# Patient Record
Sex: FEMALE | Race: WHITE | NOT HISPANIC OR LATINO | Employment: UNEMPLOYED | RURAL
[De-identification: names, ages, dates, MRNs, and addresses within clinical notes are randomized per-mention and may not be internally consistent; named-entity substitution may affect disease eponyms.]

---

## 2024-11-15 ENCOUNTER — OFFICE VISIT (OUTPATIENT)
Facility: CLINIC | Age: 19
End: 2024-11-15
Payer: MEDICAID

## 2024-11-15 VITALS
TEMPERATURE: 98 F | BODY MASS INDEX: 30.84 KG/M2 | HEART RATE: 80 BPM | RESPIRATION RATE: 18 BRPM | OXYGEN SATURATION: 98 % | DIASTOLIC BLOOD PRESSURE: 69 MMHG | WEIGHT: 153 LBS | SYSTOLIC BLOOD PRESSURE: 115 MMHG | HEIGHT: 59 IN

## 2024-11-15 DIAGNOSIS — N92.1 PROLONGED MENSTRUATION: ICD-10-CM

## 2024-11-15 DIAGNOSIS — N94.6 DYSMENORRHEA: ICD-10-CM

## 2024-11-15 DIAGNOSIS — E66.811 OBESITY (BMI 30.0-34.9): ICD-10-CM

## 2024-11-15 DIAGNOSIS — N89.8 VAGINA ITCHING: Primary | ICD-10-CM

## 2024-11-15 LAB
CANDIDA SPECIES: POSITIVE
GARDNERELLA: NEGATIVE
HCT VFR BLD AUTO: 40.8 % (ref 38–47)
HGB BLD-MCNC: 13.6 G/DL (ref 12–16)
TRICHOMONAS: NEGATIVE

## 2024-11-15 PROCEDURE — 87480 CANDIDA DNA DIR PROBE: CPT | Mod: ,,, | Performed by: CLINICAL MEDICAL LABORATORY

## 2024-11-15 PROCEDURE — 1159F MED LIST DOCD IN RCRD: CPT | Mod: CPTII,,, | Performed by: ADVANCED PRACTICE MIDWIFE

## 2024-11-15 PROCEDURE — 3008F BODY MASS INDEX DOCD: CPT | Mod: CPTII,,, | Performed by: ADVANCED PRACTICE MIDWIFE

## 2024-11-15 PROCEDURE — 85018 HEMOGLOBIN: CPT | Mod: ,,, | Performed by: CLINICAL MEDICAL LABORATORY

## 2024-11-15 PROCEDURE — 87510 GARDNER VAG DNA DIR PROBE: CPT | Mod: ,,, | Performed by: CLINICAL MEDICAL LABORATORY

## 2024-11-15 PROCEDURE — 99203 OFFICE O/P NEW LOW 30 MIN: CPT | Mod: ,,, | Performed by: ADVANCED PRACTICE MIDWIFE

## 2024-11-15 PROCEDURE — 87660 TRICHOMONAS VAGIN DIR PROBE: CPT | Mod: ,,, | Performed by: CLINICAL MEDICAL LABORATORY

## 2024-11-15 PROCEDURE — 85014 HEMATOCRIT: CPT | Mod: ,,, | Performed by: CLINICAL MEDICAL LABORATORY

## 2024-11-15 PROCEDURE — 3078F DIAST BP <80 MM HG: CPT | Mod: CPTII,,, | Performed by: ADVANCED PRACTICE MIDWIFE

## 2024-11-15 PROCEDURE — 3074F SYST BP LT 130 MM HG: CPT | Mod: CPTII,,, | Performed by: ADVANCED PRACTICE MIDWIFE

## 2024-11-15 RX ORDER — NORELGESTROMIN AND ETHINYL ESTRADIOL 35; 150 UG/MG; UG/MG
1 PATCH TRANSDERMAL WEEKLY
Qty: 3 PATCH | Refills: 11 | Status: SHIPPED | OUTPATIENT
Start: 2024-11-15 | End: 2025-11-15

## 2024-11-15 RX ORDER — NORELGESTROMIN AND ETHINYL ESTRADIOL 35; 150 UG/MG; UG/MG
1 PATCH TRANSDERMAL WEEKLY
COMMUNITY
Start: 2024-10-22 | End: 2024-11-15

## 2024-11-15 RX ORDER — IBUPROFEN 800 MG/1
800 TABLET ORAL EVERY 8 HOURS PRN
Qty: 60 TABLET | Refills: 2 | Status: SHIPPED | OUTPATIENT
Start: 2024-11-15 | End: 2025-11-15

## 2024-11-15 RX ORDER — CEPHALEXIN 500 MG/1
500 CAPSULE ORAL
COMMUNITY
Start: 2024-11-11 | End: 2024-11-21

## 2024-11-15 NOTE — LETTER
November 15, 2024      [facility]  [address]  [place] [address]  Phone: [phone]  Fax: [phone]       Patient: Marisol Farias   YOB: 2005  Date of Visit: 11/15/2024    To Whom It May Concern:    Marisol Farias  was at [facility] on 11/15/2024. The patient may return to work/school on 11/16/2024 with no restrictions. If you have any questions or concerns, or if I can be of further assistance, please do not hesitate to contact me.    Sincerely,    Jeremiah Brock LPN

## 2024-11-17 DIAGNOSIS — B37.31 YEAST VAGINITIS: Primary | ICD-10-CM

## 2024-11-17 PROBLEM — N89.8 VAGINA ITCHING: Status: ACTIVE | Noted: 2024-11-17

## 2024-11-17 PROBLEM — N94.6 DYSMENORRHEA: Status: ACTIVE | Noted: 2024-11-17

## 2024-11-17 PROBLEM — N92.1 PROLONGED MENSTRUATION: Status: ACTIVE | Noted: 2024-11-17

## 2024-11-17 RX ORDER — FLUCONAZOLE 150 MG/1
150 TABLET ORAL
Qty: 4 TABLET | Refills: 0 | Status: SHIPPED | OUTPATIENT
Start: 2024-11-17 | End: 2024-12-09

## 2024-11-18 ENCOUNTER — TELEPHONE (OUTPATIENT)
Dept: OBSTETRICS AND GYNECOLOGY | Facility: CLINIC | Age: 19
End: 2024-11-18
Payer: MEDICAID

## 2024-11-18 NOTE — PROGRESS NOTES
Patient ID:  Marisol Farias is a 19 y.o. female      Chief Complaint:   Chief Complaint   Patient presents with    Dysmenorrhea     Menstrual Pains  [facility] blood and urine test from her, put her on Birth Control patches, they told her to put it on the first day of her cycle. When she did that she put the patch on the bleeding increased on 2024 she went to [facility] ER they told her that she had a bad UTI and gave her medications to treat and told her to follow back up with the provider that gave her the patch but the [facility] didn't do anything.    Health Information     Stop using the Birth Control patch   Sexually Active  Declines STD Testing  Currently On Her cycle 11/15/2024  Never had a PAP    Vaginal Itching     X2 days         HPI:  Marisol presents as a new patient with c/o painful menstrual cycles and vaginal itching. [facility]. Was started on contraceptive patches for cycle regulation with instructions to start use first cycle day. Reports placed patch as instructed, bleeding increased so removed patch. 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 Was at work today and sent home by manager due to c/o painful cramps.  LMP: Patient's last menstrual period was 11/15/2024. 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     Headache     Hypothyroidism      Past Surgical History:   Procedure Laterality Date    ADENOIDECTOMY      TONSILLECTOMY         OB History          0    Para   0    Term   0       0    AB   0    Living   0         SAB   0    IAB   0    Ectopic   0     "Multiple   0    Live Births   0                 /69 (BP Location: Left arm, Patient Position: Sitting)   Pulse 80   Temp 98.2 °F (36.8 °C) (Oral)   Resp 18   Ht 4' 11" (1.499 m)   Wt 69.4 kg (153 lb)   LMP 11/15/2024   SpO2 98%   BMI 30.90 kg/m²   Wt Readings from Last 3 Encounters:   11/15/24 69.4 kg (153 lb)   11/26/23 72.7 kg (160 lb 3.2 oz)   07/07/23 73 kg (161 lb)          ROS:  Review of Systems   Constitutional: Negative.    Eyes: Negative.    Respiratory: Negative.     Cardiovascular: Negative.    Gastrointestinal: Negative.    Genitourinary:  Positive for dysmenorrhea and menstrual problem.   Musculoskeletal: Negative.    Neurological: Negative.    Psychiatric/Behavioral: Negative.            PHYSICAL EXAM:  Physical Exam  Constitutional:       Appearance: Normal appearance. She is obese.   Eyes:      Extraocular Movements: Extraocular movements intact.   Cardiovascular:      Rate and Rhythm: Normal rate.      Pulses: Normal pulses.   Pulmonary:      Effort: Pulmonary effort is normal.   Abdominal:      Palpations: Abdomen is soft.   Musculoskeletal:         General: Normal range of motion.      Cervical back: Normal range of motion.   Skin:     General: Skin is warm and dry.   Neurological:      Mental Status: She is alert and oriented to person, place, and time.   Psychiatric:         Mood and Affect: Mood normal.         Behavior: Behavior normal.         Thought Content: Thought content normal.         Judgment: Judgment normal.          Assessment:  Marisol Mueller" was seen today for dysmenorrhea, health information and vaginal itching.    Diagnoses and all orders for this visit:    Vagina itching  -     Bacterial Vaginosis    Dysmenorrhea  -     ibuprofen (ADVIL,MOTRIN) 800 MG tablet; Take 1 tablet (800 mg total) by mouth every 8 (eight) hours as needed for Pain. Take 1 tablet every 8 hours during menstrual cycle for up to 7 days. Take with food to prevent upset stomach.  -     " norelgestromin-ethinyl estradiol (XULANE) 150-35 mcg/24 hr; Place 1 patch onto the skin once a week.    Prolonged menstruation  -     Hemoglobin and Hematocrit; Future  -     Hemoglobin and Hematocrit    Obesity (BMI 30.0-34.9)          ICD-10-CM ICD-9-CM    1. Vagina itching  N89.8 698.1 Bacterial Vaginosis      2. Dysmenorrhea  N94.6 625.3 ibuprofen (ADVIL,MOTRIN) 800 MG tablet      norelgestromin-ethinyl estradiol (XULANE) 150-35 mcg/24 hr      3. Prolonged menstruation  N92.1 626.2 Hemoglobin and Hematocrit      Hemoglobin and Hematocrit      4. Obesity (BMI 30.0-34.9)  E66.811 278.00           Plan:  Affirm swab self collected  Blood drawn for H&H  Will call or send My Chart message after results reviewed  Explained cycle benefits with use of contraceptive patches  Encouraged to restart, use consistently for next 11 weeks, can leave off 12th week for cycle, new rx sent  Rx Ibuprofen sent, instructed on use every 8 hours for up to 7 days when bleeding, only use during cycle, take with food    No follow-ups on file.

## 2024-11-18 NOTE — TELEPHONE ENCOUNTER
----- Message from Katarina Gaxiola sent at 11/17/2024  6:28 PM CST -----  Review labs. Rx sent for Diflucan to treat yeast infection has been sent in to her pharmacy. H&H wnl, no anemia. Continue patches and Ibuprofen. Follow up in 3 months. May need to update her My [facility].

## 2024-11-18 NOTE — PROGRESS NOTES
Review labs. Rx sent for Diflucan to treat yeast infection has been sent in to her pharmacy. H&H wnl, no anemia. Continue patches and Ibuprofen. Follow up in 3 months. May need to update her My [facility].

## 2024-12-10 ENCOUNTER — TELEPHONE (OUTPATIENT)
Dept: OBSTETRICS AND GYNECOLOGY | Facility: CLINIC | Age: 19
End: 2024-12-10
Payer: MEDICAID

## 2024-12-10 NOTE — TELEPHONE ENCOUNTER
----- Message from Tech Laturina sent at 12/10/2024  8:56 AM CST -----  Who Called: Marisol Farias    Caller is requesting a sooner appointment. Caller declined first available appointment listed below. Caller will not accept being placed on the waitlist and is requesting a message be sent to doctor.    When is the first available appointment?N/A   Options offered (Virtual Visit, Urgent Care):   Symptoms: PAINFUL CYST       Preferred Method of Contact: Phone Call  Patient's Preferred Phone Number on File: [phone]   Best Call Back Number, if different:[phone]  Additional Information: Patient would like to be seen because of a cyst in her groin area that's painful when she walks. I did not see anything available it said send in-basket.

## 2024-12-16 ENCOUNTER — OFFICE VISIT (OUTPATIENT)
Facility: CLINIC | Age: 19
End: 2024-12-16
Payer: MEDICAID

## 2024-12-16 VITALS
HEART RATE: 94 BPM | HEIGHT: 59 IN | DIASTOLIC BLOOD PRESSURE: 62 MMHG | WEIGHT: 155.19 LBS | RESPIRATION RATE: 17 BRPM | SYSTOLIC BLOOD PRESSURE: 103 MMHG | OXYGEN SATURATION: 99 % | BODY MASS INDEX: 31.28 KG/M2

## 2024-12-16 DIAGNOSIS — Z00.00 NORMAL EXTERNAL GENITALIA EXAM: Primary | ICD-10-CM

## 2024-12-16 DIAGNOSIS — E66.811 OBESITY (BMI 30.0-34.9): ICD-10-CM

## 2024-12-16 PROCEDURE — 3078F DIAST BP <80 MM HG: CPT | Mod: CPTII,,, | Performed by: ADVANCED PRACTICE MIDWIFE

## 2024-12-16 PROCEDURE — 99212 OFFICE O/P EST SF 10 MIN: CPT | Mod: ,,, | Performed by: ADVANCED PRACTICE MIDWIFE

## 2024-12-16 PROCEDURE — 1159F MED LIST DOCD IN RCRD: CPT | Mod: CPTII,,, | Performed by: ADVANCED PRACTICE MIDWIFE

## 2024-12-16 PROCEDURE — 3074F SYST BP LT 130 MM HG: CPT | Mod: CPTII,,, | Performed by: ADVANCED PRACTICE MIDWIFE

## 2024-12-16 PROCEDURE — 3008F BODY MASS INDEX DOCD: CPT | Mod: CPTII,,, | Performed by: ADVANCED PRACTICE MIDWIFE

## 2024-12-19 NOTE — PROGRESS NOTES
"Patient ID:  Marisol Farias is a 19 y.o. female      Chief Complaint:   Chief Complaint   Patient presents with    Knot on Labia     Showed up a week ago and had a head on it and started draining blood        HPI:  Marisol is in with c/o bump on labia which appeared  week ago. Reports area drained and has decreased in size.   LMP: Patient's last menstrual period was 2024.   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     Headache     Hypothyroidism      Past Surgical History:   Procedure Laterality Date    ADENOIDECTOMY      TONSILLECTOMY         OB History          0    Para   0    Term   0       0    AB   0    Living   0         SAB   0    IAB   0    Ectopic   0    Multiple   0    Live Births   0                 /62 (BP Location: Left arm, Patient Position: Sitting)   Pulse 94   Resp 17   Ht 4' 11" (1.499 m)   Wt 70.4 kg (155 lb 3.2 oz)   LMP 2024   SpO2 99%   BMI 31.35 kg/m²   Wt Readings from Last 3 Encounters:   24 70.4 kg (155 lb 3.2 oz)   11/15/24 69.4 kg (153 lb)   23 72.7 kg (160 lb 3.2 oz)          ROS:  Review of Systems   Constitutional: Negative.    Eyes: Negative.    Respiratory: Negative.     Cardiovascular: Negative.    Gastrointestinal: Negative.    Genitourinary:  Positive for genital sores.   Musculoskeletal: Negative.    Neurological: Negative.    Psychiatric/Behavioral: Negative.            PHYSICAL EXAM:  Physical Exam  Constitutional:       Appearance: Normal appearance. She is obese.   Eyes:      Extraocular Movements: Extraocular movements intact.   Cardiovascular:      Rate and Rhythm: Normal rate.      Pulses: Normal pulses.   Pulmonary:      Effort: Pulmonary effort " "is normal.   Genitourinary:     Labia:         Right: No rash, tenderness or lesion.         Left: No rash, tenderness or lesion.       Comments: External genitalia examined thoroughly. No lesions, boils, or cysts noted.   Musculoskeletal:         General: Normal range of motion.      Cervical back: Normal range of motion.   Skin:     General: Skin is warm and dry.   Neurological:      Mental Status: She is alert and oriented to person, place, and time.   Psychiatric:         Mood and Affect: Mood normal.         Behavior: Behavior normal.         Thought Content: Thought content normal.         Judgment: Judgment normal.          Assessment:  Marisol Mueller" was seen today for knot on labia.    Diagnoses and all orders for this visit:    Normal external genitalia exam    Obesity (BMI 30.0-34.9)          ICD-10-CM ICD-9-CM    1. Normal external genitalia exam  Z00.00 V70.9       2. Obesity (BMI 30.0-34.9)  E66.811 278.00           Plan:  Explained "knot" may have been a boil[facility]  Encouraged to avoid shaving for a few week    Follow up for care as needed.        "

## 2025-01-24 ENCOUNTER — OFFICE VISIT (OUTPATIENT)
Dept: FAMILY MEDICINE | Facility: CLINIC | Age: 20
End: 2025-01-24
Payer: MEDICAID

## 2025-01-24 VITALS
SYSTOLIC BLOOD PRESSURE: 117 MMHG | RESPIRATION RATE: 16 BRPM | DIASTOLIC BLOOD PRESSURE: 77 MMHG | HEART RATE: 80 BPM | OXYGEN SATURATION: 97 % | TEMPERATURE: 98 F

## 2025-01-24 DIAGNOSIS — N30.00 ACUTE CYSTITIS WITHOUT HEMATURIA: ICD-10-CM

## 2025-01-24 DIAGNOSIS — R30.0 DYSURIA: Primary | ICD-10-CM

## 2025-01-24 LAB
AMORPHOUS CRYSTALS, UA (OHS): ABNORMAL /HPF
BACTERIA #/AREA URNS HPF: ABNORMAL /HPF
BILIRUB UR QL STRIP: NEGATIVE
CLARITY UR: CLEAR
COLOR UR: ABNORMAL
GLUCOSE UR STRIP-MCNC: NORMAL MG/DL
KETONES UR STRIP-SCNC: NEGATIVE MG/DL
LEUKOCYTE ESTERASE UR QL STRIP: ABNORMAL
MUCOUS, UA: ABNORMAL /LPF
NITRITE UR QL STRIP: NEGATIVE
PH UR STRIP: 6 PH UNITS
PROT UR QL STRIP: NEGATIVE
RBC # UR STRIP: ABNORMAL /UL
RBC #/AREA URNS HPF: 1 /HPF
SP GR UR STRIP: 1.02
SQUAMOUS #/AREA URNS LPF: ABNORMAL /HPF
UROBILINOGEN UR STRIP-ACNC: NORMAL MG/DL
WBC #/AREA URNS HPF: 19 /HPF

## 2025-01-24 PROCEDURE — 3078F DIAST BP <80 MM HG: CPT | Mod: CPTII,,, | Performed by: FAMILY MEDICINE

## 2025-01-24 PROCEDURE — 99213 OFFICE O/P EST LOW 20 MIN: CPT | Mod: GC,,, | Performed by: FAMILY MEDICINE

## 2025-01-24 PROCEDURE — 1160F RVW MEDS BY RX/DR IN RCRD: CPT | Mod: CPTII,,, | Performed by: FAMILY MEDICINE

## 2025-01-24 PROCEDURE — 1159F MED LIST DOCD IN RCRD: CPT | Mod: CPTII,,, | Performed by: FAMILY MEDICINE

## 2025-01-24 PROCEDURE — 3074F SYST BP LT 130 MM HG: CPT | Mod: CPTII,,, | Performed by: FAMILY MEDICINE

## 2025-01-24 PROCEDURE — 81001 URINALYSIS AUTO W/SCOPE: CPT | Mod: ,,, | Performed by: CLINICAL MEDICAL LABORATORY

## 2025-01-24 PROCEDURE — 87086 URINE CULTURE/COLONY COUNT: CPT | Mod: ,,, | Performed by: CLINICAL MEDICAL LABORATORY

## 2025-01-24 RX ORDER — SULFAMETHOXAZOLE AND TRIMETHOPRIM 800; 160 MG/1; MG/1
1 TABLET ORAL 2 TIMES DAILY
Qty: 6 TABLET | Refills: 0 | Status: SHIPPED | OUTPATIENT
Start: 2025-01-24 | End: 2025-01-27

## 2025-01-24 NOTE — PROGRESS NOTES
Subjective:       Patient ID: Marisol Farias is a 19 y.o. female.    Chief Complaint: Dysuria    18 yo F with a PMH of UTI, asthma, anxiety/dep, ADHD, von willebrand disease, hypothyroidism, vulvovaginal candida who presents to [facility] clinic with dysuria. Symptoms started 2 days ago, she took OTC Azo and increased fluid intake with some relief, then today symptoms were worse with dysuria and burning sensation. She has a history of UTI and symptoms remind her of another episode. Endorses mild suprapubic pain. Denies flank pain. Denies f/c/cp/sob/n/v/d. Denies appetite, urinary, or bowel habit changes. Denies vaginal discharge or bloody discharge. Patient is sexually active with one partner and denies multiple or new partners. Patient's period started today. She doesn't think she is pregnant.           Current Outpatient Medications:     cetirizine (ZYRTEC) 10 MG tablet, Take 1 tablet (10 mg total) by mouth once daily., Disp: 30 tablet, Rfl: 3    ibuprofen (ADVIL,MOTRIN) 800 MG tablet, Take 1 tablet (800 mg total) by mouth every 8 (eight) hours as needed for Pain. Take 1 tablet every 8 hours during menstrual cycle for up to 7 days. Take with food to prevent upset stomach., Disp: 60 tablet, Rfl: 2    sertraline (ZOLOFT) 50 MG tablet, Take 1 tablet (50 mg total) by mouth once daily., Disp: 90 tablet, Rfl: 3    sulfamethoxazole-trimethoprim 800-160mg (BACTRIM DS) 800-160 mg Tab, Take 1 tablet by mouth 2 (two) times daily. 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   HENT:  Negative for trouble swallowing.    Eyes:  Negative for visual disturbance.   Respiratory:  Negative for shortness of breath.    Cardiovascular:  Negative for chest pain.   Gastrointestinal:  Negative for abdominal pain, diarrhea, nausea and vomiting.   Genitourinary:  Positive for dysuria. Negative for bladder incontinence, decreased urine volume, difficulty urinating, flank pain, hematuria, vaginal bleeding and vaginal discharge.   Musculoskeletal:  Negative for back pain.   Integumentary:  Negative for wound.   Neurological:  Negative for syncope and headaches.   Psychiatric/Behavioral:  Negative for sleep disturbance.          Current Medications:   Medication List with Changes/Refills   New Medications    SULFAMETHOXAZOLE-TRIMETHOPRIM 800-160MG (BACTRIM DS) 800-160 MG TAB    Take 1 tablet by mouth 2 (two) times daily. for 3 days       Start Date: 2025 End Date: 2025   Current Medications    CETIRIZINE (ZYRTEC) 10 MG TABLET    Take 1 tablet (10 mg total) by mouth once daily.       Start Date: 5/3/2022  End Date: --    IBUPROFEN (ADVIL,MOTRIN) 800 MG TABLET    Take 1 tablet (800 mg total) by mouth every 8 (eight) hours as needed for Pain. Take 1 tablet every 8 hours during menstrual cycle for up to 7 days. Take with food to prevent upset stomach.       Start Date: 11/15/2024End Date: 11/15/2025    SERTRALINE (ZOLOFT) 50 MG  TABLET    Take 1 tablet (50 mg total) by mouth once daily.       Start Date: 7/7/2023  End Date: 7/6/2024   Discontinued Medications    ALBUTEROL SULFATE (PROAIR RESPICLICK) 90 MCG/ACTUATION INHALER    Inhale into the lungs every 6 (six) hours as needed.       Start Date: --        End Date: 1/24/2025    ASMANEX TWISTHALER 220 MCG/ ACTUATION (30) INHALER    Inhale 1 puff into the lungs 2 (two) times daily.       Start Date: 2/23/2021 End Date: 1/24/2025    AZITHROMYCIN (Z-MATEO) 250 MG TABLET    Take 2 tablets by mouth on day 1; Take 1 tablet by mouth on days 2-5       Start Date: 11/26/2023End Date: 1/24/2025    EPINEPHRINE (EPIPEN) 0.3 MG/0.3 ML ATIN    INJECT 0.3 mL intramuscularly once AS NEEDED FOR anaphylaxis       Start Date: 1/5/2022  End Date: 1/24/2025    FLUTICASONE PROPIONATE (FLONASE) 50 MCG/ACTUATION NASAL SPRAY    2 sprays by Each Nostril route once daily.       Start Date: 2/23/2021 End Date: 1/24/2025    NORELGESTROMIN-ETHINYL ESTRADIOL (XULANE) 150-35 MCG/24 HR    Place 1 patch onto the skin once a week.       Start Date: 11/15/2024End Date: 1/24/2025    ONDANSETRON (ZOFRAN) 4 MG TABLET    Take 1 tablet (4 mg total) by mouth every 6 (six) hours as needed for Nausea.       Start Date: 12/5/2022 End Date: 1/24/2025            Objective:        Vitals:    01/24/25 1751   BP: 117/77   Patient Position: Sitting   Pulse: 80   Resp: 16   Temp: 97.8 °F (36.6 °C)   SpO2: 97%       Physical Exam  Vitals and nursing note reviewed.   Constitutional:       General: She is not in acute distress.     Appearance: Normal appearance. She is not ill-appearing, toxic-appearing or diaphoretic.   HENT:      Head: Normocephalic and atraumatic.      Right Ear: External ear normal.      Left Ear: External ear normal.      Nose: Nose normal.      Mouth/Throat:      Pharynx: Oropharynx is clear. No oropharyngeal exudate or posterior oropharyngeal erythema.   Eyes:      General: No scleral icterus.        Right eye: No  discharge.         Left eye: No discharge.      Pupils: Pupils are equal, round, and reactive to light.   Cardiovascular:      Rate and Rhythm: Normal rate and regular rhythm.      Pulses: Normal pulses.      Heart sounds: Normal heart sounds. No murmur heard.  Pulmonary:      Effort: Pulmonary effort is normal. No respiratory distress.      Breath sounds: Normal breath sounds. No wheezing.   Abdominal:      General: There is no distension.      Palpations: Abdomen is soft.   Musculoskeletal:         General: No swelling or deformity.      Cervical back: Neck supple.      Right lower leg: No edema.      Left lower leg: No edema.   Skin:     General: Skin is warm and dry.      Coloration: Skin is not jaundiced.   Neurological:      Mental Status: She is alert.      Gait: Gait normal.   Psychiatric:         Mood and Affect: Mood normal.         Behavior: Behavior normal.         Thought Content: Thought content normal.         Judgment: Judgment normal.               Lab Results   Component Value Date    WBC 7.82 07/07/2023    HGB 13.6 11/15/2024    HCT 40.8 11/15/2024     07/07/2023    CHOL 180 07/07/2023    TRIG 151 (H) 07/07/2023    HDL 41 07/07/2023    ALT 21 07/07/2023    AST 18 07/07/2023     07/07/2023    K 3.8 07/07/2023     (H) 07/07/2023    CREATININE 0.68 07/07/2023    BUN 12 07/07/2023    CO2 26 07/07/2023    HGBA1C 4.9 11/18/2021      Assessment:       1. Dysuria    2. Acute cystitis without hematuria        Plan:         Problem List Items Addressed This Visit          Renal/    Acute cystitis without hematuria     Monitor  dysuria. Symptoms started 2 days ago, she took OTC Azo and increased fluid intake with some relief, then today symptoms were worse with dysuria and burning sensation. She has a history of UTI and symptoms remind her of another episode. Endorses mild suprapubic pain. Denies flank pain. Denies f/c/cp/sob/n/v/d. Denies appetite, urinary, or bowel habit changes.  Denies vaginal discharge or bloody discharge. Patient is sexually active with one partner and denies multiple or new partners. Patient's period started today. She doesn't think she is pregnant.     Evaluate  VSS. Unremarkable exam - see PE    Assess  UTI most likely    Treat  UA with reflex culture pending  Bactrim DS bid x3 days  RTC as needed            Other Visit Diagnoses       Dysuria    -  Primary    Relevant Medications    sulfamethoxazole-trimethoprim 800-160mg (BACTRIM DS) 800-160 mg Tab    Other Relevant Orders    Urinalysis, Reflex to Urine Culture              Follow up if symptoms worsen or fail to improve.    Karthik Tenorio DO     Instructed patient that if symptoms fail to improve or worsen patient should seek immediate medical attention or report to the nearest emergency department. Patient expressed verbal agreement and understanding to this plan of care.

## 2025-01-25 NOTE — ASSESSMENT & PLAN NOTE
Monitor  dysuria. Symptoms started 2 days ago, she took OTC Azo and increased fluid intake with some relief, then today symptoms were worse with dysuria and burning sensation. She has a history of UTI and symptoms remind her of another episode. Endorses mild suprapubic pain. Denies flank pain. Denies f/c/cp/sob/n/v/d. Denies appetite, urinary, or bowel habit changes. Denies vaginal discharge or bloody discharge. Patient is sexually active with one partner and denies multiple or new partners. Patient's period started today. She doesn't think she is pregnant.     Evaluate  VSS. Unremarkable exam - see PE    Assess  UTI most likely    Treat  UA with reflex culture pending  Bactrim DS bid x3 days  RTC as needed

## 2025-01-26 LAB — UA COMPLETE W REFLEX CULTURE PNL UR: NO GROWTH

## 2025-01-28 NOTE — PROGRESS NOTES
All results discussed with pt over the phone: UA showed WBC 19, leukocytes trace, nitrites negative. Urine culture showed no growth. She finished 3 days of bactrim and said dysuria has resolved. She was told to f/u as needed. All questions answered.

## 2025-01-30 NOTE — PROGRESS NOTES
Monitor:All results discussed with pt over the phone:   Evaluate: U/A and urine culture lab results reviewed with patient  Assess :Acute UTI symptoms resolved after 3 day course of bactrim  Treat :UA showed WBC 19, leukocytes trace, nitrites negative.   Urine culture showed no growth.   She finished 3 days of bactrim and said dysuria has resolved.   She was told to f/u as needed.   All questions answered or at least annually for her well woman's exam.  Karthik Tenorio, [place], DO

## 2025-02-12 ENCOUNTER — TELEPHONE (OUTPATIENT)
Dept: ORTHOPEDICS | Facility: CLINIC | Age: 20
End: 2025-02-12
Payer: MEDICAID

## 2025-02-12 NOTE — TELEPHONE ENCOUNTER
----- Message from Rachna sent at 2/12/2025 10:55 AM CST -----  Pt was seen at [facility] on 2/7/25  for l foot fx, pt was scheduled to see one of the drs at [facility] but she has [facility].  She is wanting to schedule an apt    [phone]

## 2025-02-18 ENCOUNTER — OFFICE VISIT (OUTPATIENT)
Dept: ORTHOPEDICS | Facility: CLINIC | Age: 20
End: 2025-02-18
Payer: MEDICAID

## 2025-02-18 ENCOUNTER — HOSPITAL ENCOUNTER (OUTPATIENT)
Dept: RADIOLOGY | Facility: HOSPITAL | Age: 20
Discharge: HOME | End: 2025-02-18
Attending: ORTHOPAEDIC SURGERY
Payer: MEDICAID

## 2025-02-18 VITALS
HEIGHT: 59 IN | WEIGHT: 156.63 LBS | BODY MASS INDEX: 31.58 KG/M2 | HEART RATE: 88 BPM | TEMPERATURE: 98 F | DIASTOLIC BLOOD PRESSURE: 62 MMHG | SYSTOLIC BLOOD PRESSURE: 108 MMHG | OXYGEN SATURATION: 97 %

## 2025-02-18 DIAGNOSIS — M79.675 TOE PAIN, LEFT: ICD-10-CM

## 2025-02-18 DIAGNOSIS — M79.675 TOE PAIN, LEFT: Primary | ICD-10-CM

## 2025-02-18 PROCEDURE — 73660 X-RAY EXAM OF TOE(S): CPT | Mod: TC,LT

## 2025-02-18 PROCEDURE — 99213 OFFICE O/P EST LOW 20 MIN: CPT | Mod: PBBFAC,25 | Performed by: ORTHOPAEDIC SURGERY

## 2025-02-18 PROCEDURE — 28515 TREATMENT OF TOE FRACTURE: CPT | Mod: PBBFAC | Performed by: ORTHOPAEDIC SURGERY

## 2025-02-18 NOTE — LETTER
February 18, 2025      [facility]  [address] [place] [address]  Phone: [phone]  Fax: [phone]       Patient: Marisol Farias   YOB: 2005  Date of Visit: 02/18/2025    To Whom It May Concern:    Marisol Farias  was at [facility] on 02/18/2025. The patient is currently out of work. If you have any questions or concerns, or if I can be of further assistance, please do not hesitate to contact me.    Sincerely,    Lacie Madrid III, M.D.

## 2025-02-18 NOTE — LETTER
February 18, 2025      [facility]  [address] [place] [id number]  Phone: [phone]  Fax: [phone]       Patient: Marisol Farias   YOB: 2005  Date of Visit: 02/18/2025    To Whom It May Concern:    Marisol Farias  was at [facility] on 02/18/2025. The patient is currently out of work. We will reevaluate her at her next appointment. If you have any questions or concerns, or if I can be of further assistance, please do not hesitate to contact me.    Sincerely,    Zenia Madrid III, M.D.

## 2025-02-18 NOTE — PROGRESS NOTES
CC:   Chief Complaint   Patient presents with    Left Foot - Fracture     4TH & 5TH TOE FX        PREVIOUS INFO:  Left 4th and 5th toes proximal phalanx fractures date of injury on around 02/07/2025      HISTORY:   2/18/2025    Marisol Farias  is a 19 y.o. patient fell off the bed awkwardly sustaining an fractures involving the left 4th and 5th toes was seen at [facility] with a angulation of the midshaft fracture of the proximal phalanx she now works here at 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     Headache     Hypothyroidism           PAST SURGICAL HISTORY:   Past Surgical History:   Procedure Laterality Date    ADENOIDECTOMY      TONSILLECTOMY            ALLERGIES:   Review of patient's allergies indicates:   Allergen Reactions    Cat's claw     Dog dander     Grass pollen     Grass pollen-june grass standard     Mite extract     Shellfish containing products     Shellfish derived     Pcn [penicillins] Rash        MEDICATIONS :  Current Medications[1]     SOCIAL HISTORY: Social History[2]     ROS    FAMILY HISTORY:   Family History   Problem Relation Name Age of Onset    Stroke Mother      Lupus Mother            PHYSICAL EXAM:   Vitals:    02/18/25 0825   BP: 108/62   Pulse: 88   Temp: 98.2 °F (36.8 °C)               Body mass index is 31.63 kg/m².     In general, this is a well-developed, well-nourished female . The patient is alert, oriented and cooperative.      HEENT:  Normocephalic, atraumatic.  Extraocular movements are intact bilaterally.  The oropharynx is benign.       NECK:  Nontender with good range of motion.      PULMONARY: Respirations are even and non-labored.       CARDIOVASCULAR: Pulses regular by peripheral palpation.        ABDOMEN:  Soft, non-tender, non-distended.        EXTREMITIES:  Left foot skin is intact mild angulation of her toes just looking at the    Ortho Exam      RADIOGRAPHIC FINDINGS:  Left foot toes  AP and lateral views with the oblique fractures involving the proximal phalanx of the 4th and 5th toes in good alignment when compared to previous x-rays from 2025      .      IMPRESSION:  Buddy taped left foot 4th and 5th toes to the 3rd toe gentle closed reduction was performed  We will place him in a postop shoe  X-rays today    PLAN:  She is out of work follow-up x-rays 3 weeks left foot toes        No follow-ups on file.         Juancarlos Madrid III      (Subject to voice recognition error, transcription service not allowed)           [1]   Current Outpatient Medications:     cetirizine (ZYRTEC) 10 MG tablet, Take 1 tablet (10 mg total) by mouth once daily., Disp: 30 tablet, Rfl: 3    ibuprofen (ADVIL,MOTRIN) 800 MG tablet, Take 1 tablet (800 mg total) by mouth every 8 (eight) hours as needed for Pain. Take 1 tablet every 8 hours during menstrual cycle for up to 7 days. Take with food to prevent upset stomach., Disp: 60 tablet, Rfl: 2    sertraline (ZOLOFT) 50 MG tablet, Take 1 tablet (50 mg total) by mouth once daily., Disp: 90 tablet, Rfl: 3  [2]   Social History  Socioeconomic History    Marital status:    Tobacco Use    Smoking status: Former     Types: Vaping with nicotine     Quit date: 2023     Years since quittin.1     Passive exposure: Never    Smokeless tobacco: Never   Substance and Sexual Activity    Alcohol use: Never    Drug use: Never    Sexual activity: Yes     Partners: Male   Social History Narrative    Lives at home with aunt grandmother and boyfriend    Dogs and cats in home    No smoking inside

## 2025-03-11 ENCOUNTER — HOSPITAL ENCOUNTER (OUTPATIENT)
Dept: RADIOLOGY | Facility: HOSPITAL | Age: 20
Discharge: HOME | End: 2025-03-11
Attending: ORTHOPAEDIC SURGERY
Payer: MEDICAID

## 2025-03-11 ENCOUNTER — OFFICE VISIT (OUTPATIENT)
Dept: ORTHOPEDICS | Facility: CLINIC | Age: 20
End: 2025-03-11
Payer: MEDICAID

## 2025-03-11 VITALS
OXYGEN SATURATION: 97 % | HEIGHT: 59 IN | DIASTOLIC BLOOD PRESSURE: 78 MMHG | WEIGHT: 153.38 LBS | TEMPERATURE: 98 F | BODY MASS INDEX: 30.92 KG/M2 | SYSTOLIC BLOOD PRESSURE: 112 MMHG | HEART RATE: 92 BPM

## 2025-03-11 DIAGNOSIS — M79.672 LEFT FOOT PAIN: Primary | ICD-10-CM

## 2025-03-11 DIAGNOSIS — M79.672 LEFT FOOT PAIN: ICD-10-CM

## 2025-03-11 PROCEDURE — 73660 X-RAY EXAM OF TOE(S): CPT | Mod: 26,LT,, | Performed by: ORTHOPAEDIC SURGERY

## 2025-03-11 PROCEDURE — 99024 POSTOP FOLLOW-UP VISIT: CPT | Mod: ,,, | Performed by: ORTHOPAEDIC SURGERY

## 2025-03-11 PROCEDURE — 73660 X-RAY EXAM OF TOE(S): CPT | Mod: TC,LT

## 2025-03-11 PROCEDURE — 3078F DIAST BP <80 MM HG: CPT | Mod: CPTII,,, | Performed by: ORTHOPAEDIC SURGERY

## 2025-03-11 PROCEDURE — 3074F SYST BP LT 130 MM HG: CPT | Mod: CPTII,,, | Performed by: ORTHOPAEDIC SURGERY

## 2025-03-11 PROCEDURE — 99213 OFFICE O/P EST LOW 20 MIN: CPT | Mod: PBBFAC,25 | Performed by: ORTHOPAEDIC SURGERY

## 2025-03-11 PROCEDURE — 99999 PR PBB SHADOW E&M-EST. PATIENT-LVL III: CPT | Mod: PBBFAC,,, | Performed by: ORTHOPAEDIC SURGERY

## 2025-03-11 PROCEDURE — 1159F MED LIST DOCD IN RCRD: CPT | Mod: CPTII,,, | Performed by: ORTHOPAEDIC SURGERY

## 2025-03-11 NOTE — PROGRESS NOTES
CC:    Chief Complaint   Patient presents with    Left Foot - Injury     DOI 2/7 (5WKS)           Previos History :  Left 4th and 5th toes proximal phalanx fractures date of injury on around 02/07/2025          History:  3/11/2025   Marisol Farias is a 19 y.o.  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

## 2025-03-11 NOTE — LETTER
March 11, 2025      [facility]  [address] [place] [id number]  Phone: [phone]  Fax: [phone]       Patient: Marisol Farias   YOB: 2005  Date of Visit: 03/11/2025    To Whom It May Concern:    Marisol Farias  was at [facility] on 03/11/2025. The patient may return to work/school on 3/12/25 with no restrictions. If you have any questions or concerns, or if I can be of further assistance, please do not hesitate to contact me.    Sincerely,    Lacie Madrid III, M.D.

## 2025-04-30 ENCOUNTER — OFFICE VISIT (OUTPATIENT)
Dept: FAMILY MEDICINE | Facility: CLINIC | Age: 20
End: 2025-04-30
Payer: MEDICAID

## 2025-04-30 VITALS
OXYGEN SATURATION: 98 % | DIASTOLIC BLOOD PRESSURE: 64 MMHG | BODY MASS INDEX: 31.51 KG/M2 | RESPIRATION RATE: 18 BRPM | HEART RATE: 80 BPM | SYSTOLIC BLOOD PRESSURE: 100 MMHG | WEIGHT: 156 LBS | TEMPERATURE: 98 F

## 2025-04-30 DIAGNOSIS — J02.9 SORE THROAT: ICD-10-CM

## 2025-04-30 DIAGNOSIS — J01.90 ACUTE NON-RECURRENT SINUSITIS, UNSPECIFIED LOCATION: Primary | ICD-10-CM

## 2025-04-30 LAB
CTP QC/QA: YES
MOLECULAR STREP A: NEGATIVE
POC MOLECULAR INFLUENZA A AGN: NEGATIVE
POC MOLECULAR INFLUENZA B AGN: NEGATIVE
SARS-COV-2 RDRP RESP QL NAA+PROBE: NEGATIVE

## 2025-04-30 PROCEDURE — 3008F BODY MASS INDEX DOCD: CPT | Mod: CPTII,,, | Performed by: NURSE PRACTITIONER

## 2025-04-30 PROCEDURE — 99214 OFFICE O/P EST MOD 30 MIN: CPT | Mod: ,,, | Performed by: NURSE PRACTITIONER

## 2025-04-30 PROCEDURE — 87651 STREP A DNA AMP PROBE: CPT | Mod: QW,,, | Performed by: NURSE PRACTITIONER

## 2025-04-30 PROCEDURE — 87635 SARS-COV-2 COVID-19 AMP PRB: CPT | Mod: QW,,, | Performed by: NURSE PRACTITIONER

## 2025-04-30 PROCEDURE — 1159F MED LIST DOCD IN RCRD: CPT | Mod: CPTII,,, | Performed by: NURSE PRACTITIONER

## 2025-04-30 PROCEDURE — 3078F DIAST BP <80 MM HG: CPT | Mod: CPTII,,, | Performed by: NURSE PRACTITIONER

## 2025-04-30 PROCEDURE — 1160F RVW MEDS BY RX/DR IN RCRD: CPT | Mod: CPTII,,, | Performed by: NURSE PRACTITIONER

## 2025-04-30 PROCEDURE — 87502 INFLUENZA DNA AMP PROBE: CPT | Mod: QW,,, | Performed by: NURSE PRACTITIONER

## 2025-04-30 PROCEDURE — 3074F SYST BP LT 130 MM HG: CPT | Mod: CPTII,,, | Performed by: NURSE PRACTITIONER

## 2025-04-30 RX ORDER — CEFDINIR 300 MG/1
300 CAPSULE ORAL 2 TIMES DAILY
Qty: 20 CAPSULE | Refills: 0 | Status: SHIPPED | OUTPATIENT
Start: 2025-04-30 | End: 2025-05-10

## 2025-04-30 RX ORDER — CEFDINIR 300 MG/1
300 CAPSULE ORAL 2 TIMES DAILY
Qty: 20 CAPSULE | Refills: 0 | Status: SHIPPED | OUTPATIENT
Start: 2025-04-30 | End: 2025-04-30

## 2025-04-30 RX ORDER — CHLORPHENIRAMINE MALEATE AND PHENYLEPHRINE HYDROCHLORIDE 4; 10 MG/1; MG/1
1 TABLET, COATED ORAL EVERY 6 HOURS PRN
Qty: 20 TABLET | Refills: 1 | Status: SHIPPED | OUTPATIENT
Start: 2025-04-30 | End: 2025-04-30

## 2025-04-30 RX ORDER — CHLORPHENIRAMINE MALEATE AND PHENYLEPHRINE HYDROCHLORIDE 4; 10 MG/1; MG/1
1 TABLET, COATED ORAL EVERY 6 HOURS PRN
Qty: 20 TABLET | Refills: 1 | Status: SHIPPED | OUTPATIENT
Start: 2025-04-30 | End: 2025-05-10

## 2025-04-30 NOTE — LETTER
April 30, 2025      [facility]  [address]  [place] [address]  Phone: [phone]  Fax: [phone]       Patient: Marisol Farias   YOB: 2005  Date of Visit: 04/30/2025    To Whom It May Concern:    Marisol Farias  was at [facility] on 04/30/2025. The patient may return to work/school on 05/02/2025 with no restrictions. If you have any questions or concerns, or if I can be of further assistance, please do not hesitate to contact me.    Sincerely,    NORMA Coleman

## 2025-04-30 NOTE — PROGRESS NOTES
Subjective:       Patient ID: Marisol Farias is a 19 y.o. female.    Chief Complaint: Cough, Generalized Body Aches, Headache, Nasal Congestion, Otalgia, Sinus Problem, and Sore Throat    Cough, Generalized Body Aches, Headache, Nasal Congestion, Otalgia, Sinus Problem, and Sore Throat      Cough  Associated symptoms include ear pain, headaches, myalgias and a sore throat. Pertinent negatives include no chest pain, fever, rash or shortness of breath.   Headache   Associated symptoms include coughing, ear pain and a sore throat. Pertinent negatives include no abdominal pain, back pain, dizziness, eye pain, fever, nausea, neck pain, vomiting or weakness.   Otalgia   Associated symptoms include coughing, headaches and a sore throat. Pertinent negatives include no abdominal pain, neck pain, rash or vomiting.   Sinus Problem  Associated symptoms include congestion, coughing, ear pain, headaches and a sore throat. Pertinent negatives include no neck pain or shortness of breath.   Sore Throat   Associated symptoms include congestion, coughing, ear pain and headaches. Pertinent negatives include no abdominal pain, neck pain, shortness of breath or vomiting.     Review of Systems   Constitutional:  Negative for appetite change, fatigue and fever.   HENT:  Positive for nasal congestion, ear pain and sore throat.    Eyes:  Negative for pain, discharge and itching.   Respiratory:  Positive for cough. Negative for shortness of breath.    Cardiovascular:  Negative for chest pain and leg swelling.   Gastrointestinal:  Negative for abdominal pain, change in bowel habit, nausea and vomiting.   Musculoskeletal:  Positive for myalgias. Negative for back pain, gait problem and neck pain.   Integumentary:  Negative for rash and wound.   Allergic/Immunologic: Negative for immunocompromised state.   Neurological:  Positive for headaches. Negative for dizziness and weakness.   All other systems reviewed and are negative.         Objective:      Physical Exam  Vitals and nursing note reviewed.   Constitutional:       General: She is not in acute distress.     Appearance: Normal appearance. She is not ill-appearing, toxic-appearing or diaphoretic.   HENT:      Head: Normocephalic.      Right Ear: Tympanic membrane, ear canal and external ear normal.      Left Ear: Tympanic membrane, ear canal and external ear normal.      Nose: Mucosal edema, congestion and rhinorrhea present.      Right Turbinates: Swollen.      Left Turbinates: Swollen.      Mouth/Throat:      Mouth: Mucous membranes are moist.      Pharynx: Posterior oropharyngeal erythema and postnasal drip present. No oropharyngeal exudate.   Eyes:      General: No scleral icterus.        Right eye: No discharge.         Left eye: No discharge.      Extraocular Movements: Extraocular movements intact.      Conjunctiva/sclera: Conjunctivae normal.      Pupils: Pupils are equal, round, and reactive to light.   Cardiovascular:      Rate and Rhythm: Normal rate and regular rhythm.      Pulses: Normal pulses.      Heart sounds: Normal heart sounds. No murmur heard.  Pulmonary:      Effort: Pulmonary effort is normal. No respiratory distress.      Breath sounds: Normal breath sounds. No wheezing, rhonchi or rales.   Musculoskeletal:         General: Normal range of motion.      Cervical back: Normal range of motion and neck supple. No tenderness.   Lymphadenopathy:      Cervical: No cervical adenopathy.   Skin:     General: Skin is warm and dry.      Capillary Refill: Capillary refill takes less than 2 seconds.      Findings: No rash.   Neurological:      Mental Status: She is alert and oriented to person, place, and time.   Psychiatric:         Mood and Affect: Mood normal.         Behavior: Behavior normal.         Thought Content: Thought content normal.         Judgment: Judgment normal.          Assessment:       1. Acute non-recurrent sinusitis, unspecified location    2. Sore throat         Plan:   Acute non-recurrent sinusitis, unspecified location  -     chlorpheniramine-phenylephrine (ED A-HIST) 4-10 mg per tablet; Take 1 tablet by mouth every 6 (six) hours as needed for Congestion.  Dispense: 20 tablet; Refill: 1  -     cefdinir (OMNICEF) 300 MG capsule; Take 1 capsule (300 mg total) by mouth 2 (two) times daily. for 10 days  Dispense: 20 capsule; Refill: 0    Sore throat  -     POCT Influenza A/B Molecular  -     POCT Strep A, Molecular  -     POCT COVID-19 Rapid Screening           Risks, benefits, and side effects were discussed with the patient. All questions were answered to the fullest satisfaction of the patient, and pt verbalized understanding and agreement to treatment plan. Pt was to call with any new or worsening symptoms, or present to the ER

## 2025-08-21 ENCOUNTER — OFFICE VISIT (OUTPATIENT)
Dept: PRIMARY CARE CLINIC | Facility: CLINIC | Age: 20
End: 2025-08-21
Payer: COMMERCIAL

## 2025-08-21 ENCOUNTER — LAB VISIT (OUTPATIENT)
Dept: PRIMARY CARE CLINIC | Facility: CLINIC | Age: 20
End: 2025-08-21

## 2025-08-21 ENCOUNTER — HOSPITAL ENCOUNTER (OUTPATIENT)
Dept: RADIOLOGY | Facility: HOSPITAL | Age: 20
Discharge: HOME | End: 2025-08-21
Attending: NURSE PRACTITIONER
Payer: COMMERCIAL

## 2025-08-21 VITALS
HEART RATE: 86 BPM | DIASTOLIC BLOOD PRESSURE: 62 MMHG | HEIGHT: 59 IN | SYSTOLIC BLOOD PRESSURE: 111 MMHG | RESPIRATION RATE: 20 BRPM | OXYGEN SATURATION: 99 % | BODY MASS INDEX: 31.45 KG/M2 | WEIGHT: 156 LBS | TEMPERATURE: 98 F

## 2025-08-21 DIAGNOSIS — Z02.83 ENCOUNTER FOR DRUG SCREENING: ICD-10-CM

## 2025-08-21 DIAGNOSIS — S60.221A CONTUSION OF DORSUM OF RIGHT HAND: ICD-10-CM

## 2025-08-21 DIAGNOSIS — Z13.39 ALCOHOL SCREENING: Primary | ICD-10-CM

## 2025-08-21 DIAGNOSIS — S60.221A CONTUSION OF DORSUM OF RIGHT HAND: Primary | ICD-10-CM

## 2025-08-21 PROCEDURE — 73130 X-RAY EXAM OF HAND: CPT | Mod: TC,RT

## 2025-08-22 ENCOUNTER — TELEPHONE (OUTPATIENT)
Dept: URGENT CARE | Facility: CLINIC | Age: 20
End: 2025-08-22